# Patient Record
Sex: MALE | Race: ASIAN | NOT HISPANIC OR LATINO | URBAN - METROPOLITAN AREA
[De-identification: names, ages, dates, MRNs, and addresses within clinical notes are randomized per-mention and may not be internally consistent; named-entity substitution may affect disease eponyms.]

---

## 2024-04-26 ENCOUNTER — MEDICAL CORRESPONDENCE (OUTPATIENT)
Dept: HEALTH INFORMATION MANAGEMENT | Facility: CLINIC | Age: 39
End: 2024-04-26
Payer: COMMERCIAL

## 2024-05-06 ENCOUNTER — TELEPHONE (OUTPATIENT)
Dept: CONSULT | Facility: CLINIC | Age: 39
End: 2024-05-06
Payer: COMMERCIAL

## 2024-05-06 NOTE — TELEPHONE ENCOUNTER
M for patient to call me back directly to schedule GC only visit for EXT1 and EXT2 testing, per inbasket message from [facility] Rosenda DELEON.

## 2024-05-08 NOTE — TELEPHONE ENCOUNTER
Spoke with patient and assisted in scheduling appointment.     Future Appointments   Date Time Provider Department Center   6/7/2024 10:00 AM Rica Koch GC [facility]

## 2024-06-06 NOTE — PROGRESS NOTES
GENETIC COUNSELING CONSULTATION NOTE    Date of visit: Jun 7, 2024    Presenting Information:   Anthony Saravia is a 39 year old male referred to the [facility] due to his history of multiple exostoses. He was seen for a genetic counseling appointment today to discuss genetic testing for this diagnosis. He was accompanied by his wife Francheska.     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 He has had about 25-30 surgeries starting around age 8 or 9 for pain related symptoms and to remove osteochondromas that were impacting his range of motion. He reports that the osteochondromas are dispersed throughout his major joints and long bones of his upper and lower extremities and hip girdle. 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    Anthony and is wife are interested in testing an embryo or pregnancy for hereditary multiple exostoses, but no one in the family has had genetic testing for this yet to identify the familial mutation. Therefore, Anthony was referred for genetic testing.     He reports no other health concerns and is otherwise healthy.     Family History: A three generation pedigree was obtained and scanned into the electronic medical record. The relevant portions are described below:    Partner- Wife, Francheska had carrier screening as a part of their fertility work-up and she was found to be a carrier for G6PD deficiency and D-bifunctional protein deficiency. Anthony was not found to be a carrier for either of these conditions.   Children- none  Siblings-   35 year old brother who has multiple osteochondromas. He does not have as many as Anthony but has had many surgeries to remove these starting at age 4-5. He has a 3 month old son who is healthy.   Parents-   Mother, age 58 is healthy.   Father, age 65, has a history of multiple osteochondromas  Maternal Relatives-   Two maternal uncles who are reported to be alive and well as are their children.    Maternal grandmother has a history of glaucoma and cataracts and is otherwise healthy.  Maternal grandfather passed away in his 70's due to cardiac arrest.   His sister is Anthony's paternal grandmother and she has multiple osteochondromas. Some of their other siblings children are also reported to have multiple osteochondromas but details are limited.   Paternal Relatives-   One paternal uncle and four paternal aunts. Their own health history is limited but all of them have some children with multiple osteochondromas:  Uncle has 5 children all of whom have osteochondromas and his two daughters have children with osteochondromas  Aunt with 5 children has 3 of 5 with osteochondromas  Aunt with 4 children and all have osteochondromas  Aunt with 3 children and only 1 has osteochondromas  Aunt with 5 children and 2 of her 5 have osteochondromas.  Paternal grandmother had multiple osteochondromas. She developed Alzheimer's and passed away in her mid 90's.  Her brother is Anthony's maternal grandfather and he is unaffected but some of their other siblings children are also reported to have multiple osteochondromas but details are limited.  Paternal grandmother passed away in his late 60's-early 70's, cause unknown.     Family history is otherwise largely non-contributory. Ancestry is from [place]. Anthony's parents are first cousins. His maternal grandfather and paternal grandmother are siblings.     Genetic Counseling Discussion:  For review, our bodies are made of cells that contain our chromosomes which are made up of long stretches of DNA containing our genes. Our genes serve as the instructions for our bodies to grow and function. We have two copies of each gene, one inherited from our mother and one inherited from our father.     Hereditary multiple exostoses (also called hereditary multiple osteochondromas) is characterized by multiple osteochondromas (bone tumors) that grow near the growth plates of bones such as the  ribs, pelvis, vertebrae and especially long bones. Approximately 96% of affected individuals have developed some osteochondromas by age 12. Multiple osteochondromas can disrupt bone growth in the arms, hands, and legs, leading to short stature. Often these problems with bone growth do not affect the right and left limb equally, resulting in uneven limb lengths (limb length discrepancy). Bowing of the forearm or ankle and abnormal development of the hip joints (hip dysplasia) caused by osteochondromas can lead to difficulty walking and general discomfort. Multiple osteochondromas may also result in pain, limited range of joint movement, and pressure on nerves, blood vessels, the spinal cord, and tissues surrounding the osteochondromas.     It is very rare for osteochondromas to become malignant. Researchers estimate that people with hereditary multiple osteochondromas have a 1 in 20 to 1 in 200 lifetime risk of developing cancerous osteochondromas (called sarcomas). There is no medical treatment and surgery is only recommended in symptomatic exostoses or in cases where a malignant transformation is suspected.    Hereditary multiple exostoses is caused by pathogenic variants (mutations) in the EXT1 or EXT2 genes and is inherited in an autosomal dominant inheritance pattern. Autosomal dominant means an individual needs a single pathogenic/likely pathogenic variant  on one copy of the gene in order to be affected. Individuals who have a dominant condition have a 1 in 2 (50%) chance of passing their variant and the condition to each child.     In approximately 15% of cases of individuals who have multiple osteochondromas, current testing does not identify a variant in the EXT1 or EXT2 genes. There may be other yet to be identified genes that can cause multiple osteochondromas.     We reviewed the availability of genetic testing via Next Generation Sequencing (NGS) for analysis of genes known to be associated with  hereditary multiple exostoses, with the aim of determining what condition is causing Anthony's symptoms.    We went on to discuss the details, limitations, and possible outcomes of NGS. In particular, we discussed that there are three possible results from NGS:  Negative: meaning normal or no mutations are identified in the genes that were tested/sequenced  Positive: meaning a mutation that is known to be associated with a particular set of symptoms is identified  Variant of uncertain significance (VUS): meaning a change in the DNA sequence of a particular gene was seen but there is not enough information or data yet to know if it explains the symptoms. If a VUS is identified, testing of other relatives may be helpful to provide clarification.  In most cases, identification of a VUS does not confirm a diagnosis and does not result in any clinically actionable recommendations.    We discussed the potential benefits of genetic testing and why this genetic testing is medically indicated. A positive result will help determine the etiology of the multiple osteochondromas noted in Anthony and may guide the medical management for him.  Also, if a genetic cause is found for Anthony, it will give us a more accurate risk assessment for other family members, particularly Anthony's future children. We can only perform genetic testing on an IVF embryo and/or pregnancy if we know the familial mutation.     Next Generation Sequencing is a well established technology utilized by all molecular genetic labs throughout the country for identifying disease-causing mutations in various genes.  Next Generation Sequencing is currently the standard of care for genetic testing of single genes.  The recommended testing for Anthony is DIAGNOSTIC testing, and it is NOT investigational.    Anthony consented to genetic testing today. Anthony will arrange a lab appointment at [facility] for blood draw. We will  submit information for insurance prior authorization. Anthony will be contacted if testing is denied or if they estimated cost is >$300. If the estimated cost is less than $300, the lab will proceed with the testing as planned. I will call Anthony with the results about 4 weeks after testing begins.    It was a pleasure meeting Anthony and Francheska today. They were encouraged to reach out to me if they have any further questions.     Plan:  [facility] gene sequencing of EXT1 and EXT2   I will call Anthony with results about 4 weeks after testing begins      Denisa Koch MS, [facility]  Licensed Genetic Counselor   [facility]  Phone: [phone]  Fax: [phone]    Time spent in consultation face to face was approximately 25 minutes.     per pt report

## 2024-06-07 ENCOUNTER — LAB (OUTPATIENT)
Dept: LAB | Facility: CLINIC | Age: 39
End: 2024-06-07
Payer: COMMERCIAL

## 2024-06-07 ENCOUNTER — VIRTUAL VISIT (OUTPATIENT)
Dept: CONSULT | Facility: CLINIC | Age: 39
End: 2024-06-07
Attending: GENETIC COUNSELOR, MS
Payer: COMMERCIAL

## 2024-06-07 DIAGNOSIS — M21.70 ACQUIRED LEG LENGTH DISCREPANCY: ICD-10-CM

## 2024-06-07 DIAGNOSIS — Z71.83 ENCOUNTER FOR NONPROCREATIVE GENETIC COUNSELING: ICD-10-CM

## 2024-06-07 DIAGNOSIS — Z84.89 FAMILY HISTORY OF GENETIC DISEASE: ICD-10-CM

## 2024-06-07 DIAGNOSIS — Q78.6 MULTIPLE OSTEOCHONDROMA: ICD-10-CM

## 2024-06-07 DIAGNOSIS — Q78.6 MULTIPLE OSTEOCHONDROMA: Primary | ICD-10-CM

## 2024-06-07 LAB
INTERPRETATION: ABNORMAL
LAB PDF RESULT: ABNORMAL
LOCATION OF TASK: ABNORMAL
SIGNIFICANT RESULTS: ABNORMAL
SPECIMEN DESCRIPTION: ABNORMAL
TEST DETAILS, MDL: ABNORMAL

## 2024-06-07 PROCEDURE — 96040 HC GENETIC COUNSELING, EACH 30 MINUTES: CPT | Mod: GT,95 | Performed by: GENETIC COUNSELOR, MS

## 2024-06-07 NOTE — LETTER
6/7/2024      RE: Anthony Saravia  [address]  [place] [address]     Dear Colleague,    Thank you for the opportunity to participate in the care of your patient, Anthony Saravia, at the [facility] at [facility]. Please see a copy of my visit note below.    GENETIC COUNSELING CONSULTATION NOTE    Date of visit: Jun 7, 2024    Presenting Information:   Anthony Saravia is a 39 year old male referred to the [facility] due to his history of multiple exostoses. He was seen for a genetic counseling appointment today to discuss genetic testing for this diagnosis. He was accompanied by his wife Francheska.     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 He has had about 25-30 surgeries starting around age 8 or 9 for pain related symptoms and to remove osteochondromas that were impacting his range of motion. He reports that the osteochondromas are dispersed throughout his major joints and long bones of his upper and lower extremities and hip girdle. 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    Anthony and is wife are interested in testing an embryo or pregnancy for hereditary multiple exostoses, but no one in the family has had genetic testing for this yet to identify the familial mutation. Therefore, Anthony was referred for genetic testing.     He reports no other health concerns and is otherwise healthy.     Family History: A three generation pedigree was obtained and scanned into the electronic medical record. The relevant portions are described below:    Partner- Wife, Francheska had carrier screening as a part of their fertility work-up and she was found to be a carrier for G6PD deficiency and D-bifunctional protein deficiency. Anthony was not found to be a carrier for either of these conditions.   Children- none  Siblings-   35 year old brother who has multiple  osteochondromas. He does not have as many as Anthony but has had many surgeries to remove these starting at age 4-5. He has a 3 month old son who is healthy.   Parents-   Mother, age 58 is healthy.   Father, age 65, has a history of multiple osteochondromas  Maternal Relatives-   Two maternal uncles who are reported to be alive and well as are their children.   Maternal grandmother has a history of glaucoma and cataracts and is otherwise healthy.  Maternal grandfather passed away in his 70's due to cardiac arrest.   His sister is Anthony's paternal grandmother and she has multiple osteochondromas. Some of their other siblings children are also reported to have multiple osteochondromas but details are limited.   Paternal Relatives-   One paternal uncle and four paternal aunts. Their own health history is limited but all of them have some children with multiple osteochondromas:  Uncle has 5 children all of whom have osteochondromas and his two daughters have children with osteochondromas  Aunt with 5 children has 3 of 5 with osteochondromas  Aunt with 4 children and all have osteochondromas  Aunt with 3 children and only 1 has osteochondromas  Aunt with 5 children and 2 of her 5 have osteochondromas.  Paternal grandmother had multiple osteochondromas. She developed Alzheimer's and passed away in her mid 90's.  Her brother is Anthony's maternal grandfather and he is unaffected but some of their other siblings children are also reported to have multiple osteochondromas but details are limited.  Paternal grandmother passed away in his late 60's-early 70's, cause unknown.     Family history is otherwise largely non-contributory. Ancestry is from [place]. Anthony's parents are first cousins. His maternal grandfather and paternal grandmother are siblings.     Genetic Counseling Discussion:  For review, our bodies are made of cells that contain our chromosomes which are made up of long stretches of DNA containing our genes. Our  genes serve as the instructions for our bodies to grow and function. We have two copies of each gene, one inherited from our mother and one inherited from our father.     Hereditary multiple exostoses (also called hereditary multiple osteochondromas) is characterized by multiple osteochondromas (bone tumors) that grow near the growth plates of bones such as the ribs, pelvis, vertebrae and especially long bones. Approximately 96% of affected individuals have developed some osteochondromas by age 12. Multiple osteochondromas can disrupt bone growth in the arms, hands, and legs, leading to short stature. Often these problems with bone growth do not affect the right and left limb equally, resulting in uneven limb lengths (limb length discrepancy). Bowing of the forearm or ankle and abnormal development of the hip joints (hip dysplasia) caused by osteochondromas can lead to difficulty walking and general discomfort. Multiple osteochondromas may also result in pain, limited range of joint movement, and pressure on nerves, blood vessels, the spinal cord, and tissues surrounding the osteochondromas.     It is very rare for osteochondromas to become malignant. Researchers estimate that people with hereditary multiple osteochondromas have a 1 in 20 to 1 in 200 lifetime risk of developing cancerous osteochondromas (called sarcomas). There is no medical treatment and surgery is only recommended in symptomatic exostoses or in cases where a malignant transformation is suspected.    Hereditary multiple exostoses is caused by pathogenic variants (mutations) in the EXT1 or EXT2 genes and is inherited in an autosomal dominant inheritance pattern. Autosomal dominant means an individual needs a single pathogenic/likely pathogenic variant  on one copy of the gene in order to be affected. Individuals who have a dominant condition have a 1 in 2 (50%) chance of passing their variant and the condition to each child.     In approximately 15%  of cases of individuals who have multiple osteochondromas, current testing does not identify a variant in the EXT1 or EXT2 genes. There may be other yet to be identified genes that can cause multiple osteochondromas.     We reviewed the availability of genetic testing via Next Generation Sequencing (NGS) for analysis of genes known to be associated with hereditary multiple exostoses, with the aim of determining what condition is causing Anthony's symptoms.    We went on to discuss the details, limitations, and possible outcomes of NGS. In particular, we discussed that there are three possible results from NGS:  Negative: meaning normal or no mutations are identified in the genes that were tested/sequenced  Positive: meaning a mutation that is known to be associated with a particular set of symptoms is identified  Variant of uncertain significance (VUS): meaning a change in the DNA sequence of a particular gene was seen but there is not enough information or data yet to know if it explains the symptoms. If a VUS is identified, testing of other relatives may be helpful to provide clarification.  In most cases, identification of a VUS does not confirm a diagnosis and does not result in any clinically actionable recommendations.    We discussed the potential benefits of genetic testing and why this genetic testing is medically indicated. A positive result will help determine the etiology of the multiple osteochondromas noted in Anthony and may guide the medical management for him.  Also, if a genetic cause is found for Anthony, it will give us a more accurate risk assessment for other family members, particularly Anthony's future children. We can only perform genetic testing on an IVF embryo and/or pregnancy if we know the familial mutation.     Next Generation Sequencing is a well established technology utilized by all molecular genetic labs throughout the country for identifying disease-causing mutations in various genes.   Next Generation Sequencing is currently the standard of care for genetic testing of single genes.  The recommended testing for Anthony is DIAGNOSTIC testing, and it is NOT investigational.    Anthony consented to genetic testing today. Anthony will arrange a lab appointment at [facility] for blood draw. We will submit information for insurance prior authorization. Anthony will be contacted if testing is denied or if they estimated cost is >$300. If the estimated cost is less than $300, the lab will proceed with the testing as planned. I will call Anthony with the results about 4 weeks after testing begins.    It was a pleasure meeting Anthony and Francheska today. They were encouraged to reach out to me if they have any further questions.     Plan:  [facility] gene sequencing of EXT1 and EXT2   I will call Anthony with results about 4 weeks after testing begins      Denisa Koch MS, [facility]  Licensed Genetic Counselor   [facility]  Phone: [phone]  Fax: [phone]    Time spent in consultation face to face was approximately 25 minutes.      Please do not hesitate to contact me if you have any questions/concerns.     Sincerely,       Rica Koch, GC

## 2024-06-07 NOTE — NURSING NOTE
How would you like to obtain your AVS? Mail a copy  If the video visit is dropped, the invitation should be resent by: Send to e-mail at: [email or web address]  Will anyone else be joining your video visit? Yes: Wife. How would they like to receive their invitation? Send to e-mail at: [email or web address]

## 2024-06-07 NOTE — PATIENT INSTRUCTIONS
Genetics  [facility]     Contact our nurse care coordinator Lalita CHINN, RN, PHN at [phone] or send a [employer] message for any non-urgent general or medical questions.     If you had genetic testing and have further questions, please contact the genetic counselor:    Denisa Koch  Ph: [phone]    To schedule appointments:  Pediatric Call Center for [facility]: [phone]  Neuropsychology [phone]   Radiology/ Imaging/Echocardiogram: [phone]   Services:   [phone]     You should receive a phone call about your next appointment. If you do not receive this within two weeks of your visit, please call [phone].     IF REFERRALS WERE PLACED/ DISCUSSED DURING THE VISIT, PLEASE LET OUR TEAM KNOW IF YOU DO NOT HEAR FROM THE SCHEDULERS IN 2 WEEKS    If you have not already done so consider signing up for [employer] by speaking with the person at the  on your way out or go to [email or web address] to sign up online.     [employer] enables easy and confidential communication with your care team.

## 2024-06-12 ENCOUNTER — TELEPHONE (OUTPATIENT)
Dept: LAB | Facility: CLINIC | Age: 39
End: 2024-06-12
Payer: COMMERCIAL

## 2024-06-12 ENCOUNTER — LAB (OUTPATIENT)
Dept: LAB | Facility: CLINIC | Age: 39
End: 2024-06-12
Payer: COMMERCIAL

## 2024-06-12 DIAGNOSIS — Q78.6 MULTIPLE OSTEOCHONDROMA: Primary | ICD-10-CM

## 2024-06-12 DIAGNOSIS — Z84.89 FAMILY HISTORY OF GENETIC DISEASE: ICD-10-CM

## 2024-06-12 DIAGNOSIS — M21.70 ACQUIRED LEG LENGTH DISCREPANCY: ICD-10-CM

## 2024-06-12 LAB — INTERPRETATION: NORMAL

## 2024-06-12 PROCEDURE — 81479 UNLISTED MOLECULAR PATHOLOGY: CPT | Performed by: GENETIC COUNSELOR, MS

## 2024-06-12 PROCEDURE — G0452 MOLECULAR PATHOLOGY INTERPR: HCPCS | Mod: 26 | Performed by: STUDENT IN AN ORGANIZED HEALTH CARE EDUCATION/TRAINING PROGRAM

## 2024-06-12 NOTE — PROGRESS NOTES
I called Anthony to update him on insurance coverage for his genetic testing. No authorization is required. However, I was unable to reach Anthony. I left a voicemail with my name, phone number, and a brief reason for calling.    3:28 PM    Anthony returned my call.    Notified Anthony, patient, that no authorization is required for his genetic testing. This means we cannot guarantee coverage.     Explained that insurance benefits may still apply, therefore, there could be an out of pocket cost. Provided Anthony with estimated out of pocket cost for testing if approved and cost of testing is denied. Anthony is aware he is responsible for the cost of testing if his insurance does not cover testing.    Anthony expressed understanding and stated that he wants to proceed with testing. We will call when results are available. Anthony had no further questions. [facility] [employer] Hold For Preauthorization: [[id number]] order was placed by a genetics provider.      Robert Minaya  Genomics Billing    [facility]  Molecular Diagnostics Laboratory  [address] [address]  [email or web address]  [email or web address]  Office: [phone]  Fax:   Employed by [employer]

## 2024-06-25 ENCOUNTER — TELEPHONE (OUTPATIENT)
Dept: CONSULT | Facility: CLINIC | Age: 39
End: 2024-06-25
Payer: COMMERCIAL

## 2024-06-25 ENCOUNTER — MYC MEDICAL ADVICE (OUTPATIENT)
Dept: CONSULT | Facility: CLINIC | Age: 39
End: 2024-06-25
Payer: COMMERCIAL

## 2024-06-25 NOTE — TELEPHONE ENCOUNTER
I called Anthony and left a voicemail asking him to call me back to review his genetic test results. I told him I would also send a [employer] message.     Denisa Koch MS, [facility]  Licensed Genetic Counselor  [facility]  Phone: [phone]  Fax: [phone]

## 2024-06-25 NOTE — TELEPHONE ENCOUNTER
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 This is consistent with the diagnosis of autosomal dominant hereditary multiple exostoses.    Anthony has the copy of his test report that he can share with their fertility clinic and the lab doing the genetic testing ([facility]). I told him to let me know if they need any additional documentation or information from me and I would be happy to fax that to them.     We also discussed that the other affected relatives, including Anthony's brother and father, likely have the same ETX2 mutation. Anthony asked about testing his nephew and I said that is something we can do and I would be happy to help with if his brother is interested. He can share my contact information with his brother and I can discuss further.     Anthony had no other questions. I remain available for him and Francheska should they have any other testing needs or questions in the future.     Denisa Koch MS, [facility]  Licensed Genetic Counselor  [facility]  Phone: [phone]  Fax: [phone]

## 2024-07-03 NOTE — TELEPHONE ENCOUNTER
I called Anthony and left a voicemail seeing if he and his parents would like to come in person on Friday. I am out of the office tomorrow for 4th of July, so if they are interested in an in-person visit on the 5th, I asked that he call or MyChart message me back today.     Denisa Koch MS, [facility]  Licensed Genetic Counselor  [facility]  Phone: [phone]  Fax: [phone]

## 2024-07-15 NOTE — TELEPHONE ENCOUNTER
I got a call back from Anthony saying they are going to proceed without the parental testing for now. He will let me know if they need any other assistance in the future.     Denisa Koch MS, [facility]  Licensed Genetic Counselor  [facility]  Phone: [phone]  Fax: [phone]

## 2024-07-28 ENCOUNTER — HEALTH MAINTENANCE LETTER (OUTPATIENT)
Age: 39
End: 2024-07-28

## 2025-08-10 ENCOUNTER — HEALTH MAINTENANCE LETTER (OUTPATIENT)
Age: 40
End: 2025-08-10